# Patient Record
(demographics unavailable — no encounter records)

---

## 2017-03-08 NOTE — DNPDOC
Delivery Note


Delivery Note


DATE OF DELIVERY: Mar 8, 2017 at 20:43 





PREDELIVERY DIAGNOSIS: 40w5d gestation IOL for pre-pregnancy BMI 41.8, 

velamentous cord insertion





POST DELIVERY DIAGNOSIS: Delivered





PROCEDURE: Spontaneous vaginal delivery





OBSTETRICIAN: Dr. Lesia Bains MD





ANESTHESIA: epidural





ESTIMATED BLOOD LOSS: 200 mL.





FINDINGS: meconium stained fluid noted upon SROM, female infant, Apgar Score 9/9





DELIVERY SUMMARY:


Cheryle is a 29yo G3 now  who was admitted to L&D for IOL secondary to 

40w5d gestation in the setting of pre-pregnancy BMI of 41.8 and velamentous 

cord insertion. She had an uncomplicated  of a viable female infant at  

on 2017. Meconium present from the time of SROM. Head delivered OA, 

restituted MARCELINO.  No nuchal cord. Left anterior shoulder delivered followed by 

posterior shoulder and corpus. Cord clamped x2 and cut by FOB. Infant mouth/

nares bulb suctioned. Spontaneous cry noted. Baby placed on mother's abdomen. 

Apgars 9/9, weight pending as of the writing of this note for planned 1 hour of 

maternal-infant skin-to-skin bonding (please see weight documented elsewhere in 

the medical record). Cord blood obtained due to maternal blood type of O pos. 

With gentle downward guidance and suprapubic pressure, placenta delivered 

spontaneously and intact- significant velamentous cord insertion noted. Fundal 

massage until both uterine fundus and lower uterine segment firm; fundus at U-

1.  Pitocin 30 units IV bolus administered. Inspection of perineum and vaginal 

wall revealed superficial right labial abrasion with good hemostasis. Mom and 

infant in stable condition.








LESIA BAINS MD Mar 8, 2017 21:20

## 2017-03-09 NOTE — IPNPDOC
Text Note


Date of Service


The patient was seen on 3/9/17.





NOTE


PPD 1





Cheryle is a 27yo  doing well on PPD 1 s/p uncomplicated  at  on 3/

8/17. She is breastfeeding. Lochia normal, spontaneously voiding and ambulating 

without difficulty. Tolerating regular diet. Denies f/c/n/v/SOB/CP/HA/abdominal 

pain.





Vitals wnl (some recent mild range bp's), afebrile


Exam:


General: WDWN, NAD, morbidly obese, resting comfortably


Cardiac: S1S2 present, no murmur


Lungs: CTAB without wheeze/crackles


Abdomen: soft, NTTP, fundus firm u-2cm


Extremities: no tenderness of calves bilaterally





Assessment: Cheryle is a 27yo  doing well on PPD 1 s/p uncomplicated  

at  on 3/8/17. No e/o infection, hemodynamically stable.





Plan: 


-routine postpartum care


-regular diet


-motrin and tylenol prn pain


-desires minipill for contraception


-likely discharge home tomorrow





Dr. Lesia Bains MD


[place] REE





VS,Alfred, I+O


VS, Alfred, I+O





 Vital Signs








  Date Time  Temp Pulse Resp B/P Pulse Ox O2 Delivery O2 Flow Rate FiO2


 


3/9/17 05:59 97.3 93 18 140/87 97 Room Air  














 I&O- Last 24 Hours up to 6 AM 


 


 3/9/17





 06:00


 


Intake Total 2520 ml


 


Output Total 3100 ml


 


Balance -580 ml














LESIA BAINS MD Mar 9, 2017 10:53